# Patient Record
Sex: FEMALE | URBAN - METROPOLITAN AREA
[De-identification: names, ages, dates, MRNs, and addresses within clinical notes are randomized per-mention and may not be internally consistent; named-entity substitution may affect disease eponyms.]

---

## 2019-03-18 ENCOUNTER — TELEPHONE (OUTPATIENT)
Dept: FAMILY MEDICINE | Facility: CLINIC | Age: 84
End: 2019-03-18

## 2019-03-18 NOTE — TELEPHONE ENCOUNTER
----- Message from Rebeca Barrientos sent at 3/18/2019  2:22 PM CDT -----  Contact: darius Tejada  MRN: [id number]  : 1912  PCP: No primary care provider on file.  Home Phone      [phone]      MESSAGE:     Ata needs the adenium for the scripts for a bed, chair, & lift.     [phone]